# Patient Record
Sex: MALE | URBAN - METROPOLITAN AREA
[De-identification: names, ages, dates, MRNs, and addresses within clinical notes are randomized per-mention and may not be internally consistent; named-entity substitution may affect disease eponyms.]

---

## 2023-09-06 ENCOUNTER — APPOINTMENT (RX ONLY)
Dept: URBAN - METROPOLITAN AREA CLINIC 308 | Facility: CLINIC | Age: 53
Setting detail: DERMATOLOGY
End: 2023-09-06

## 2023-09-06 DIAGNOSIS — L57.0 ACTINIC KERATOSIS: ICD-10-CM

## 2023-09-06 DIAGNOSIS — L81.4 OTHER MELANIN HYPERPIGMENTATION: ICD-10-CM

## 2023-09-06 DIAGNOSIS — Z87.2 PERSONAL HISTORY OF DISEASES OF THE SKIN AND SUBCUTANEOUS TISSUE: ICD-10-CM

## 2023-09-06 DIAGNOSIS — L72.8 OTHER FOLLICULAR CYSTS OF THE SKIN AND SUBCUTANEOUS TISSUE: ICD-10-CM

## 2023-09-06 DIAGNOSIS — I78.8 OTHER DISEASES OF CAPILLARIES: ICD-10-CM

## 2023-09-06 DIAGNOSIS — L82.1 OTHER SEBORRHEIC KERATOSIS: ICD-10-CM

## 2023-09-06 DIAGNOSIS — D22 MELANOCYTIC NEVI: ICD-10-CM

## 2023-09-06 DIAGNOSIS — L71.8 OTHER ROSACEA: ICD-10-CM | Status: INADEQUATELY CONTROLLED

## 2023-09-06 DIAGNOSIS — D18.0 HEMANGIOMA: ICD-10-CM

## 2023-09-06 PROBLEM — D22.5 MELANOCYTIC NEVI OF TRUNK: Status: ACTIVE | Noted: 2023-09-06

## 2023-09-06 PROBLEM — D18.01 HEMANGIOMA OF SKIN AND SUBCUTANEOUS TISSUE: Status: ACTIVE | Noted: 2023-09-06

## 2023-09-06 PROCEDURE — 99204 OFFICE O/P NEW MOD 45 MIN: CPT | Mod: 25

## 2023-09-06 PROCEDURE — ? PRESCRIPTION MEDICATION MANAGEMENT

## 2023-09-06 PROCEDURE — ? LIQUID NITROGEN

## 2023-09-06 PROCEDURE — ? FULL BODY SKIN EXAM

## 2023-09-06 PROCEDURE — ? COUNSELING

## 2023-09-06 PROCEDURE — ? PRESCRIPTION

## 2023-09-06 PROCEDURE — ? ADDITIONAL NOTES

## 2023-09-06 PROCEDURE — 17000 DESTRUCT PREMALG LESION: CPT

## 2023-09-06 PROCEDURE — ? TREATMENT REGIMEN

## 2023-09-06 RX ORDER — DOXYCYCLINE HYCLATE 50 MG/1
CAPSULE, GELATIN COATED ORAL
Qty: 60 | Refills: 2 | Status: ERX | COMMUNITY
Start: 2023-09-06

## 2023-09-06 RX ADMIN — DOXYCYCLINE HYCLATE: 50 CAPSULE, GELATIN COATED ORAL at 00:00

## 2023-09-06 ASSESSMENT — LOCATION SIMPLE DESCRIPTION DERM
LOCATION SIMPLE: LEFT LIP
LOCATION SIMPLE: RIGHT UPPER BACK
LOCATION SIMPLE: RIGHT CHEEK
LOCATION SIMPLE: LEFT CHEEK
LOCATION SIMPLE: RIGHT BACK
LOCATION SIMPLE: LEFT SHOULDER
LOCATION SIMPLE: RIGHT SHOULDER
LOCATION SIMPLE: POSTERIOR NECK

## 2023-09-06 ASSESSMENT — LOCATION DETAILED DESCRIPTION DERM
LOCATION DETAILED: RIGHT SUPERIOR POSTERIOR NECK
LOCATION DETAILED: LEFT MEDIAL MALAR CHEEK
LOCATION DETAILED: RIGHT SUPERIOR LATERAL UPPER BACK
LOCATION DETAILED: LEFT ANTERIOR SHOULDER
LOCATION DETAILED: LEFT UPPER CUTANEOUS LIP
LOCATION DETAILED: LEFT INFERIOR CENTRAL MALAR CHEEK
LOCATION DETAILED: RIGHT ANTERIOR SHOULDER
LOCATION DETAILED: LEFT SUPERIOR LATERAL MALAR CHEEK
LOCATION DETAILED: RIGHT SUPERIOR UPPER BACK
LOCATION DETAILED: RIGHT MEDIAL MALAR CHEEK

## 2023-09-06 ASSESSMENT — LOCATION ZONE DERM
LOCATION ZONE: TRUNK
LOCATION ZONE: FACE
LOCATION ZONE: ARM
LOCATION ZONE: NECK
LOCATION ZONE: LIP

## 2023-09-06 NOTE — HPI: EVALUATION OF SKIN LESION(S)
What Type Of Note Output Would You Prefer (Optional)?: Standard Output
Hpi Title: Evaluation of Skin Lesions
Have Your Spot(S) Been Treated In The Past?: has not been treated
Additional History: Pt presents for a FBSE with spot of concern on the back of his neck. He also would like to discuss his previously diagnosed rosacea. He is currently using otc topical ivermectin and otc 10% azelaic acid and has not seen improvement. Previously tried oral MCN and does not remember it helping.

## 2023-09-06 NOTE — PROCEDURE: LIQUID NITROGEN
Detail Level: Detailed
Duration Of Freeze Thaw-Cycle (Seconds): 0
Show Applicator Variable?: Yes
Post-Care Instructions: I reviewed with the patient in detail post-care instructions. Pt may apply Vaseline or Aquaphor to crusted or scabbing areas.
Consent: The patient's verbal consent was obtained including but not limited to risks of crusting, scabbing, blistering, scarring, darker or lighter pigmentary change, recurrence, incomplete removal.

## 2023-09-06 NOTE — PROCEDURE: PRESCRIPTION MEDICATION MANAGEMENT
Initiate Treatment: Take 50 mg of doxycycline BID
Detail Level: Zone
Render In Strict Bullet Format?: No
Samples Given: Zilxi and Epsolay were given today.

## 2023-10-12 ENCOUNTER — RX ONLY (OUTPATIENT)
Age: 53
Setting detail: RX ONLY
End: 2023-10-12

## 2023-10-12 ENCOUNTER — APPOINTMENT (RX ONLY)
Dept: URBAN - METROPOLITAN AREA CLINIC 308 | Facility: CLINIC | Age: 53
Setting detail: DERMATOLOGY
End: 2023-10-12

## 2023-10-12 RX ORDER — BENZOYL PEROXIDE 50 MG/G
CREAM TOPICAL
Qty: 30 | Refills: 5 | Status: ERX | COMMUNITY
Start: 2023-10-12

## 2023-10-12 RX ORDER — MINOCYCLINE 15 MG/G
AEROSOL, FOAM TOPICAL
Qty: 30 | Refills: 5 | Status: ERX | COMMUNITY
Start: 2023-10-12

## 2024-03-13 ENCOUNTER — APPOINTMENT (RX ONLY)
Dept: URBAN - METROPOLITAN AREA CLINIC 308 | Facility: CLINIC | Age: 54
Setting detail: DERMATOLOGY
End: 2024-03-13

## 2024-03-13 DIAGNOSIS — L71.8 OTHER ROSACEA: ICD-10-CM

## 2024-03-13 PROCEDURE — ? IPL COSMETIC

## 2024-03-13 ASSESSMENT — LOCATION SIMPLE DESCRIPTION DERM
LOCATION SIMPLE: RIGHT FOREHEAD
LOCATION SIMPLE: RIGHT CHEEK
LOCATION SIMPLE: LEFT CHEEK
LOCATION SIMPLE: NOSE
LOCATION SIMPLE: CHIN
LOCATION SIMPLE: LEFT LIP
LOCATION SIMPLE: LEFT FOREHEAD

## 2024-03-13 ASSESSMENT — LOCATION DETAILED DESCRIPTION DERM
LOCATION DETAILED: LEFT CENTRAL MALAR CHEEK
LOCATION DETAILED: RIGHT CHIN
LOCATION DETAILED: LEFT FOREHEAD
LOCATION DETAILED: LEFT LOWER CUTANEOUS LIP
LOCATION DETAILED: RIGHT CENTRAL MALAR CHEEK
LOCATION DETAILED: NASAL DORSUM
LOCATION DETAILED: RIGHT FOREHEAD

## 2024-03-13 ASSESSMENT — LOCATION ZONE DERM
LOCATION ZONE: LIP
LOCATION ZONE: FACE
LOCATION ZONE: NOSE

## 2024-03-13 NOTE — PROCEDURE: IPL COSMETIC
Fluence (Include Units): 12 (spot 13)
Add Additional Location: No
Fluence (Include Units): 15(strong/short), 16 spot
Detail Level: Zone
Cooling: normal
Post-Care Instructions: I reviewed with the patient in detail post-care instructions. Patient should stay away from the sun and wear sun protection until treated areas are fully healed.
Number Of Passes: 3
Spotsize (Include Units): [id number]
Show Price In Visit Note: Yes
Pulse Energy (Include Units): short
Ipl Type: [place]-In Mode
Pre-Procedure Care: Prior to the procedure the patient and all present had protective eyewear in place and a warning sign was placed on the door.
Pulse Duration (Include Units): Short (and Long)
Eye Protection: Laser Aid
Indication: rosacea
End-Point And Post-Procedure Care: The procedure continued until mild purpura was noted.  Immediately following the procedure, Vaseline and ice applied. Post care reviewed with patient.
Wavelength (Include Units): 515 nm
Wavelength (Include Units): 515nm
Consent: Prior to the procedure consent obtained, risks reviewed including but not limited to crusting, scabbing, blistering, scarring, darker or lighter pigmentary change, incidental hair removal, bruising, and/or incomplete removal.
Price (Use Numbers Only, No Special Characters Or $): 147
Beam Overlap/Density (Include Units): 10%

## 2024-05-13 ENCOUNTER — APPOINTMENT (RX ONLY)
Dept: URBAN - METROPOLITAN AREA CLINIC 373 | Facility: CLINIC | Age: 54
Setting detail: DERMATOLOGY
End: 2024-05-13

## 2024-05-13 DIAGNOSIS — Z41.9 ENCOUNTER FOR PROCEDURE FOR PURPOSES OTHER THAN REMEDYING HEALTH STATE, UNSPECIFIED: ICD-10-CM

## 2024-05-13 PROCEDURE — ? VENUS VERSA IPL

## 2024-05-13 ASSESSMENT — LOCATION DETAILED DESCRIPTION DERM
LOCATION DETAILED: NASAL SUPRATIP
LOCATION DETAILED: LEFT CENTRAL MALAR CHEEK
LOCATION DETAILED: RIGHT CENTRAL MALAR CHEEK

## 2024-05-13 ASSESSMENT — LOCATION SIMPLE DESCRIPTION DERM
LOCATION SIMPLE: RIGHT CHEEK
LOCATION SIMPLE: NOSE
LOCATION SIMPLE: LEFT CHEEK

## 2024-05-13 ASSESSMENT — LOCATION ZONE DERM
LOCATION ZONE: NOSE
LOCATION ZONE: FACE

## 2024-06-10 ENCOUNTER — APPOINTMENT (RX ONLY)
Dept: URBAN - METROPOLITAN AREA CLINIC 373 | Facility: CLINIC | Age: 54
Setting detail: DERMATOLOGY
End: 2024-06-10

## 2024-06-10 DIAGNOSIS — Z41.9 ENCOUNTER FOR PROCEDURE FOR PURPOSES OTHER THAN REMEDYING HEALTH STATE, UNSPECIFIED: ICD-10-CM

## 2024-06-10 PROCEDURE — ? VENUS VERSA IPL

## 2024-06-10 PROCEDURE — ? MICRONEEDLING

## 2024-06-10 ASSESSMENT — LOCATION SIMPLE DESCRIPTION DERM
LOCATION SIMPLE: NOSE
LOCATION SIMPLE: RIGHT CHEEK
LOCATION SIMPLE: LEFT CHEEK
LOCATION SIMPLE: SUPERIOR FOREHEAD
LOCATION SIMPLE: CHIN

## 2024-06-10 ASSESSMENT — LOCATION ZONE DERM
LOCATION ZONE: FACE
LOCATION ZONE: NOSE

## 2024-06-10 ASSESSMENT — LOCATION DETAILED DESCRIPTION DERM
LOCATION DETAILED: LEFT LATERAL MALAR CHEEK
LOCATION DETAILED: NASAL SUPRATIP
LOCATION DETAILED: RIGHT LATERAL MALAR CHEEK
LOCATION DETAILED: RIGHT CHIN
LOCATION DETAILED: SUPERIOR MID FOREHEAD

## 2024-06-10 NOTE — PROCEDURE: MICRONEEDLING
Location #3: temples/chin
Depth In Mm (Location #1): 1
Depth In Mm (Location #2): 2
Length Of Topical Anesthesia Application (Optional): 30 minutes
Infusions (Optional): hyaluronic acid
How Many Cc Of Bacteriostatic 0.9% Saline Were Added?: 0
Depth In Mm (Location #5): 0.5
Location #5: lip nose eyes
Price (Use Numbers Only, No Special Characters Or $): 632
Depth In Mm (Location #3): 1.5
Post-Care Instructions: After the procedure, take precautions agains sun exposure. Do not apply sunscreen for 12 hours after the procedure. Do not apply make-up for 12 hours after the procedure. Avoid alcohol based toners for 10-14 days. After 2-3 days patients can return to their regular skin regimen.
Location #1: forehead
Detail Level: Zone
Consent: Written consent obtained, risks reviewed including but not limited to pain, scarring, infection and incomplete improvement.  Patient understands the procedure is cosmetic in nature and will require out of pocket payment.
Location #2: Cheeks
Topical Anesthesia?: 20% benzocaine, 10% lidocaine, 10% tetracaine

## 2024-06-10 NOTE — PROCEDURE: VENUS VERSA IPL
Treated Area: small area
Applicator: [facility]
Frequency: 1 Hz
Number Of Passes: 2
Cooling: 100
Fluence: 15
Comment: spot treated nose
Detail Level: Zone
Post-Care Instructions: I reviewed with the patient in detail post-care instructions. Patient should stay away from the sun and wear sun protection until treated areas are fully healed.
Anesthesia Volume In Cc: 0
Pulse Duration (In Milliseconds): 20
Skin Type (Optional): III
Consent: Written consent obtained, risks reviewed including but not limited to crusting, scabbing, blistering, scarring, darker or lighter pigmentary change, bruising, and/or incomplete response.
Price (Use Numbers Only, No Special Characters Or $): 120
Treatment Number: 1
Fluence Units: J/cm2
Pulse Count: 7
Pulse Mode: single

## 2024-07-11 ENCOUNTER — APPOINTMENT (RX ONLY)
Dept: URBAN - METROPOLITAN AREA CLINIC 373 | Facility: CLINIC | Age: 54
Setting detail: DERMATOLOGY
End: 2024-07-11

## 2024-07-11 DIAGNOSIS — Z41.9 ENCOUNTER FOR PROCEDURE FOR PURPOSES OTHER THAN REMEDYING HEALTH STATE, UNSPECIFIED: ICD-10-CM

## 2024-07-11 PROCEDURE — ? VENUS VERSA IPL

## 2024-07-11 PROCEDURE — ? SKINPEN

## 2024-07-11 ASSESSMENT — LOCATION ZONE DERM
LOCATION ZONE: FACE
LOCATION ZONE: NOSE

## 2024-07-11 ASSESSMENT — LOCATION SIMPLE DESCRIPTION DERM
LOCATION SIMPLE: NOSE
LOCATION SIMPLE: RIGHT CHEEK
LOCATION SIMPLE: CHIN
LOCATION SIMPLE: LEFT CHEEK
LOCATION SIMPLE: SUPERIOR FOREHEAD

## 2024-07-11 ASSESSMENT — LOCATION DETAILED DESCRIPTION DERM
LOCATION DETAILED: RIGHT LATERAL MALAR CHEEK
LOCATION DETAILED: SUPERIOR MID FOREHEAD
LOCATION DETAILED: LEFT LATERAL MALAR CHEEK
LOCATION DETAILED: NASAL SUPRATIP
LOCATION DETAILED: LEFT CHIN

## 2024-07-11 NOTE — PROCEDURE: SKINPEN
Location #3: Temples/chin
Depth In Mm: 1.5
Post-Care Instructions: After the procedure, take precautions agains sun exposure. Do not apply sunscreen for 12 hours after the procedure. Do not apply make-up for 12 hours after the procedure. Avoid alcohol based toners for 10-14 days. After 2-3 days patients can return to their regular skin regimen.
Treatment Number (Optional): 0
Serum (Optional): Hyaluronic Hydrating Gel
Price (Use Numbers Only, No Special Characters Or $): 282
Depth In Mm: 2
Depth In Mm: 0.5
Location #2: cheeks
Consent: Written consent obtained, risks reviewed including but not limited to pain, scarring, infection and incomplete improvement.  Patient understands the procedure is cosmetic in nature and will require out of pocket payment.
Location #1: forehead
Location #4: Lip/nose/eyes
Detail Level: Zone

## 2024-07-11 NOTE — PROCEDURE: VENUS VERSA IPL
Number Of Passes: 1
Skin Type (Optional): II
Pulse Delay (In Milliseconds): 0
Comment: spotted the nose
Detail Level: Zone
Pulse Count: 12
Post-Care Instructions: I reviewed with the patient in detail post-care instructions. Patient should stay away from the sun and wear sun protection until treated areas are fully healed. A post care git was given to the patient that included a gentle wash to be used starting 24 hours post, Elta MD laser balm, and sunscreen.
Pulse Duration (In Milliseconds): 10
Fluence: 16
Cooling: 80
Frequency: 1 Hz
Pulse Mode: single
Treated Area: large area
Consent: Written consent obtained, risks reviewed including but not limited to crusting, scabbing, blistering, scarring, darker or lighter pigmentary change, bruising, and/or incomplete response.
Applicator: [facility]
Fluence Units: J/cm2

## 2024-07-15 ENCOUNTER — RX ONLY (OUTPATIENT)
Age: 54
Setting detail: RX ONLY
End: 2024-07-15

## 2024-07-15 ENCOUNTER — APPOINTMENT (RX ONLY)
Dept: URBAN - METROPOLITAN AREA CLINIC 373 | Facility: CLINIC | Age: 54
Setting detail: DERMATOLOGY
End: 2024-07-15

## 2024-07-15 DIAGNOSIS — L71.8 OTHER ROSACEA: ICD-10-CM | Status: INADEQUATELY CONTROLLED

## 2024-07-15 PROBLEM — L71.1 RHINOPHYMA: Status: ACTIVE | Noted: 2024-07-15

## 2024-07-15 PROCEDURE — ? PRESCRIPTION MEDICATION MANAGEMENT

## 2024-07-15 PROCEDURE — ? COUNSELING

## 2024-07-15 PROCEDURE — 99214 OFFICE O/P EST MOD 30 MIN: CPT

## 2024-07-15 RX ORDER — DOXYCYCLINE HYCLATE 50 MG/1
CAPSULE, GELATIN COATED ORAL
Qty: 60 | Refills: 2 | Status: ERX

## 2024-07-15 ASSESSMENT — LOCATION SIMPLE DESCRIPTION DERM: LOCATION SIMPLE: NOSE

## 2024-07-15 ASSESSMENT — LOCATION ZONE DERM: LOCATION ZONE: NOSE

## 2024-07-15 ASSESSMENT — LOCATION DETAILED DESCRIPTION DERM: LOCATION DETAILED: NASAL DORSUM

## 2024-07-15 NOTE — PROCEDURE: PRESCRIPTION MEDICATION MANAGEMENT
Discontinue Regimen: Ivermectin\\nClindamycin
Continue Regimen: Doxycycline 50mg BID for flares then drop to 50mg daily once clear
Render In Strict Bullet Format?: No
Detail Level: Zone
Plan: CO2 ablative laser for nose

## 2024-08-12 ENCOUNTER — APPOINTMENT (RX ONLY)
Dept: URBAN - METROPOLITAN AREA CLINIC 373 | Facility: CLINIC | Age: 54
Setting detail: DERMATOLOGY
End: 2024-08-12

## 2024-08-12 DIAGNOSIS — Z41.9 ENCOUNTER FOR PROCEDURE FOR PURPOSES OTHER THAN REMEDYING HEALTH STATE, UNSPECIFIED: ICD-10-CM

## 2024-08-12 PROCEDURE — ? SKINPEN

## 2024-08-12 ASSESSMENT — LOCATION SIMPLE DESCRIPTION DERM
LOCATION SIMPLE: RIGHT FOREHEAD
LOCATION SIMPLE: LEFT CHEEK
LOCATION SIMPLE: RIGHT CHEEK
LOCATION SIMPLE: CHIN

## 2024-08-12 ASSESSMENT — LOCATION DETAILED DESCRIPTION DERM
LOCATION DETAILED: RIGHT MEDIAL FOREHEAD
LOCATION DETAILED: RIGHT CENTRAL MALAR CHEEK
LOCATION DETAILED: LEFT CENTRAL MALAR CHEEK
LOCATION DETAILED: LEFT CHIN

## 2024-08-12 ASSESSMENT — LOCATION ZONE DERM: LOCATION ZONE: FACE

## 2024-08-12 NOTE — PROCEDURE: SKINPEN
Price (Use Numbers Only, No Special Characters Or $): 415
Topical Anesthesia?: 20% benzocaine, 10% lidocaine, 10% tetracaine
Consent: Written consent obtained, risks reviewed including but not limited to pain, scarring, infection and incomplete improvement.  Patient understands the procedure is cosmetic in nature and will require out of pocket payment.
Depth In Mm: 2.25
Location #4: Lip/nose/eyes
Serum (Optional): Hyaluronic Hydrating Gel
Post-Care Instructions: After the procedure, take precautions agains sun exposure. Do not apply sunscreen for 12 hours after the procedure. Do not apply make-up for 12 hours after the procedure. Avoid alcohol based toners for 10-14 days. After 2-3 days patients can return to their regular skin regimen.
Depth In Mm: 1.5
Location #2: cheeks
Location #1: forehead
Location #3: Temples/chin
Depth In Mm: 0.5
Detail Level: Zone
Treatment Number (Optional): 3

## 2024-11-25 ENCOUNTER — RX ONLY (OUTPATIENT)
Age: 54
Setting detail: RX ONLY
End: 2024-11-25

## 2024-11-25 RX ORDER — DOXYCYCLINE HYCLATE 50 MG/1
CAPSULE, GELATIN COATED ORAL
Qty: 60 | Refills: 0 | Status: ERX

## 2025-01-02 ENCOUNTER — RX ONLY (RX ONLY)
Age: 55
End: 2025-01-02

## 2025-01-02 RX ORDER — DOXYCYCLINE HYCLATE 50 MG/1
CAPSULE, GELATIN COATED ORAL
Qty: 60 | Refills: 0 | Status: ERX

## 2025-02-10 ENCOUNTER — RX ONLY (RX ONLY)
Age: 55
End: 2025-02-10

## 2025-02-10 RX ORDER — DOXYCYCLINE HYCLATE 50 MG/1
CAPSULE, GELATIN COATED ORAL
Qty: 60 | Refills: 5 | Status: ERX

## 2025-06-13 ENCOUNTER — RX ONLY (RX ONLY)
Age: 55
End: 2025-06-13

## 2025-06-13 RX ORDER — DOXYCYCLINE HYCLATE 50 MG/1
CAPSULE, GELATIN COATED ORAL
Qty: 60 | Refills: 1 | Status: ERX

## 2025-07-17 ENCOUNTER — APPOINTMENT (OUTPATIENT)
Dept: URBAN - METROPOLITAN AREA CLINIC 373 | Facility: CLINIC | Age: 55
Setting detail: DERMATOLOGY
End: 2025-07-17

## 2025-07-17 DIAGNOSIS — L71.8 OTHER ROSACEA: ICD-10-CM | Status: STABLE

## 2025-07-17 DIAGNOSIS — L72.8 OTHER FOLLICULAR CYSTS OF THE SKIN AND SUBCUTANEOUS TISSUE: ICD-10-CM

## 2025-07-17 PROBLEM — L71.1 RHINOPHYMA: Status: ACTIVE | Noted: 2025-07-17

## 2025-07-17 PROCEDURE — ? PRESCRIPTION MEDICATION MANAGEMENT

## 2025-07-17 PROCEDURE — ? CONSULTATION EXCISION

## 2025-07-17 PROCEDURE — ? COUNSELING

## 2025-07-17 PROCEDURE — ? PRESCRIPTION

## 2025-07-17 RX ORDER — DOXYCYCLINE HYCLATE 50 MG/1
CAPSULE, GELATIN COATED ORAL
Qty: 60 | Refills: 6 | Status: ERX

## 2025-07-17 ASSESSMENT — LOCATION SIMPLE DESCRIPTION DERM
LOCATION SIMPLE: POSTERIOR NECK
LOCATION SIMPLE: NOSE

## 2025-07-17 ASSESSMENT — LOCATION DETAILED DESCRIPTION DERM
LOCATION DETAILED: NASAL DORSUM
LOCATION DETAILED: RIGHT MEDIAL TRAPEZIAL NECK

## 2025-07-17 ASSESSMENT — LOCATION ZONE DERM
LOCATION ZONE: NECK
LOCATION ZONE: NOSE

## 2025-07-17 NOTE — PROCEDURE: COUNSELING
Detail Level: Detailed
Patient Specific Counseling (Will Not Stick From Patient To Patient): Recommend Dual mode laser for texture on nose quoted 800 for full face, 300 for just nose

## 2025-07-17 NOTE — HPI: RASH (ROSACEA)
Is This A New Presentation, Or A Follow-Up?: Follow Up Rosacea
Additional History: Spot on posterior neck

## 2025-07-17 NOTE — PROCEDURE: PRESCRIPTION MEDICATION MANAGEMENT
Discontinue Regimen: Ivermectin\\nClindamycin
Continue Regimen: Doxycycline 50mg BID until clear then decrease to 1 pill daily
Render In Strict Bullet Format?: No
Detail Level: Zone
Plan: CO2 ablative laser for nose